# Patient Record
Sex: FEMALE | Race: WHITE | URBAN - METROPOLITAN AREA
[De-identification: names, ages, dates, MRNs, and addresses within clinical notes are randomized per-mention and may not be internally consistent; named-entity substitution may affect disease eponyms.]

---

## 2020-01-30 ENCOUNTER — OUTPATIENT (OUTPATIENT)
Dept: OUTPATIENT SERVICES | Facility: HOSPITAL | Age: 55
LOS: 1 days | Discharge: HOME | End: 2020-01-30
Payer: COMMERCIAL

## 2020-01-30 DIAGNOSIS — R07.9 CHEST PAIN, UNSPECIFIED: ICD-10-CM

## 2020-01-30 PROCEDURE — 75574 CT ANGIO HRT W/3D IMAGE: CPT | Mod: 26

## 2023-12-17 ENCOUNTER — EMERGENCY (EMERGENCY)
Facility: HOSPITAL | Age: 58
LOS: 0 days | Discharge: HOME | End: 2023-12-17
Attending: EMERGENCY MEDICINE
Payer: COMMERCIAL

## 2023-12-17 VITALS
SYSTOLIC BLOOD PRESSURE: 132 MMHG | OXYGEN SATURATION: 100 % | RESPIRATION RATE: 18 BRPM | DIASTOLIC BLOOD PRESSURE: 74 MMHG | TEMPERATURE: 98 F | WEIGHT: 179.9 LBS | HEART RATE: 80 BPM

## 2023-12-17 DIAGNOSIS — S62.623B DISPLACED FRACTURE OF MIDDLE PHALANX OF LEFT MIDDLE FINGER, INITIAL ENCOUNTER FOR OPEN FRACTURE: ICD-10-CM
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
## 2023-12-17 DIAGNOSIS — Z23 ENCOUNTER FOR IMMUNIZATION: ICD-10-CM

## 2023-12-17 DIAGNOSIS — M79.645 PAIN IN LEFT FINGER(S): ICD-10-CM

## 2023-12-17 PROCEDURE — 12001 RPR S/N/AX/GEN/TRNK 2.5CM/<: CPT | Mod: 59

## 2023-12-17 PROCEDURE — 73130 X-RAY EXAM OF HAND: CPT | Mod: 26,LT

## 2023-12-17 PROCEDURE — 90471 IMMUNIZATION ADMIN: CPT

## 2023-12-17 PROCEDURE — 73130 X-RAY EXAM OF HAND: CPT | Mod: LT

## 2023-12-17 PROCEDURE — 99284 EMERGENCY DEPT VISIT MOD MDM: CPT | Mod: 25,57

## 2023-12-17 PROCEDURE — 12001 RPR S/N/AX/GEN/TRNK 2.5CM/<: CPT

## 2023-12-17 PROCEDURE — 26720 TREAT FINGER FRACTURE EACH: CPT | Mod: 54

## 2023-12-17 PROCEDURE — 99283 EMERGENCY DEPT VISIT LOW MDM: CPT | Mod: 25

## 2023-12-17 PROCEDURE — 90715 TDAP VACCINE 7 YRS/> IM: CPT

## 2023-12-17 RX ORDER — TETANUS TOXOID, REDUCED DIPHTHERIA TOXOID AND ACELLULAR PERTUSSIS VACCINE, ADSORBED 5; 2.5; 8; 8; 2.5 [IU]/.5ML; [IU]/.5ML; UG/.5ML; UG/.5ML; UG/.5ML
0.5 SUSPENSION INTRAMUSCULAR ONCE
Refills: 0 | Status: COMPLETED | OUTPATIENT
Start: 2023-12-17 | End: 2023-12-17

## 2023-12-17 RX ADMIN — TETANUS TOXOID, REDUCED DIPHTHERIA TOXOID AND ACELLULAR PERTUSSIS VACCINE, ADSORBED 0.5 MILLILITER(S): 5; 2.5; 8; 8; 2.5 SUSPENSION INTRAMUSCULAR at 23:25

## 2023-12-17 NOTE — ED PROVIDER NOTE - CLINICAL SUMMARY MEDICAL DECISION MAKING FREE TEXT BOX
Healthy 58-year-old female here for assessment of left fifth digit pain after closing her finger in a car door.  Patient has small, 0.75 cm superficial laceration over palmar aspect of fifth digit, middle phalanx with significant tenderness to palpation in the same area.  Good cap refill, good pulses.    X-ray demonstrates fracture of middle phalanx.  Wound was cleaned and closed, ring on fourth digit was removed.  Given that this is technically an open fracture, antibiotics were initiated however is not an open fracture, does not need hand eval at this time or washout.  The finger was splinted as noted.    Advised patient on need for symptom monitoring, signs of infection, wound care, return precautions, need for medication compliance.  Will DC home.

## 2023-12-17 NOTE — ED PROCEDURE NOTE - CPROC ED TIME OUT STATEMENT1
“Patient's name, , procedure and correct site were confirmed during the [place] Timeout.” “Patient's name, , procedure and correct site were confirmed during the [place] Timeout.”

## 2023-12-17 NOTE — ED PROVIDER NOTE - OBJECTIVE STATEMENT
58 year old female presents to the ED with finger pain. Patients left 5th digit was caught between the car and door causing crush injury. She now has throbbing pain, swelling, redness, and a small laceration to the palmar side of the finger. She is able to move and bend the finger and has sensation. Denies active bleeding, wrist pain, other injury.

## 2023-12-17 NOTE — ED PROCEDURE NOTE - CPROC ED TIME OUT STATEMENT1
“Patient's name, , procedure and correct site were confirmed during the [place] Timeout.” “Patient's name, , procedure and correct site were confirmed during the [place] Timeout.”

## 2023-12-17 NOTE — ED PROCEDURE NOTE - NS ED ATTENDING STATEMENT MOD
This was a shared visit with the STEVE. I reviewed and verified the documentation and independently performed the documented:

## 2023-12-17 NOTE — ED PROVIDER NOTE - CARE PROVIDER_API CALL
Christopher Ayala [facility]  Plastic Surgery  [address] [place] [address]  Phone: [phone]  Fax: [phone]  Follow Up Time:    Christopher Ayala [facility]  Plastic Surgery  [address] [place] [address]  Phone: [phone]  Fax: [phone]  Follow Up Time:

## 2023-12-17 NOTE — ED PROVIDER NOTE - PHYSICAL EXAMINATION
Physical Exam    Constitutional: No acute distress.   Eyes: Conjunctiva pink, Sclera clear, PERRLA, EOMI.  ENT: No sinus tenderness. No nasal discharge. No oropharyngeal erythema, edema, or exudates. Uvula midline.   Cardiovascular: Regular rate, regular rhythm. No noted murmurs rubs or gallops.  Respiratory: unlabored respiratory effort, clear to auscultation bilaterally no wheezing, rales or rhonchi  Gastrointestinal: Normal bowel sounds. soft, non distended, non-tender abdomen.   Musculoskeletal: supple neck, no midline tenderness. Left 5th digit edematous with sub ungual hematoma. 2cm lac to DIP pinky.   Integumentary: warm, dry, no rash  Neurologic: awake, alert, cranial nerves II-XII grossly intact, extremities’ motor and sensory functions grossly intact  Psychiatric: appropriate mood, appropriate affect

## 2023-12-17 NOTE — ED PROVIDER NOTE - NSFOLLOWUPINSTRUCTIONS_ED_ALL_ED_FT
Please keep splint in place. Return to the ED in 7 days for removal of sutures. Please follow up with Dr. Ayala within 1 week for evaluation of laceration and fracture. Keep wound dry for 24 hours. Please take Augmentin twice per day for 7 days to prevent infection.     Our Emergency Department Referral Coordinators will be reaching out to you in the next 24-48 hours from 9:00am to 5:00pm with a follow up appointment. Please expect a phone call from the hospital in that time frame. If you do not receive a call or if you have any questions or concerns, you can reach them at [phone]     Finger Fracture    WHAT YOU NEED TO KNOW:    What is a finger fracture? A finger fracture is a break in 1 or more of the bones in your finger. It is most commonly caused by a direct blow to the finger.    What are the signs and symptoms of a finger fracture?     Pain, bruising, or swelling      Weakness or numbness      Trouble moving your finger      Finger looks abnormally shaped    How is a finger fracture diagnosed? Your healthcare provider will examine you and ask about your injury. You may also need an x-ray.     How is a finger fracture treated?     A cast or splint may be needed to prevent movement and protect your finger so it can heal.       NSAIDs, such as ibuprofen, help decrease swelling, pain, and fever. This medicine is available with or without a doctor's order. NSAIDs can cause stomach bleeding or kidney problems in certain people. If you take blood thinner medicine, always ask your healthcare provider if NSAIDs are safe for you. Always read the medicine label and follow directions.      Acetaminophen decreases pain and fever. It is available without a doctor's order. Ask how much to take and how often to take it. Follow directions. Acetaminophen can cause liver damage if not taken correctly.      Prescription pain medicine may be given. Ask your healthcare provider how to take this medicine safely. Some prescription pain medicines contain acetaminophen. Do not take other medicines that contain acetaminophen without talking to your healthcare provider. Too much acetaminophen may cause liver damage. Prescription pain medicine may cause constipation. Ask your healthcare provider how to prevent or treat constipation.       Closed reduction may be done to put your bones back into their correct position without surgery.       Open reduction surgery may be needed to put your bones back into the correct position. This may include the use of special wires, pins, plates or screws. These help keep the broken pieces lined up so your finger can heal correctly.    How can I manage my symptoms?     Apply ice on your finger for 15 to 20 minutes every hour or as directed. Use an ice pack, or put crushed ice in a plastic bag. Cover it with a towel before you apply it to your skin. Ice helps prevent tissue damage and decreases swelling and pain.      Elevate your finger above the level of your heart as often as you can. This will help decrease swelling and pain. Prop your hand on pillows or blankets to keep it elevated comfortably.       Go to physical therapy as directed. A physical therapist teaches you exercises to help improve movement and strength, and to decrease pain.     When should I seek immediate care?     Your cast or splint gets wet, damaged, or comes off.      Your splint or cast feels too tight.      You have severe pain.      Your injured finger is numb, cold, or pale.    When should I contact my healthcare provider?     Your pain or swelling gets worse, even after treatment.      You have questions or concerns about your condition or care.    CARE AGREEMENT:    You have the right to help plan your care. Learn about your health condition and how it may be treated. Discuss treatment options with your healthcare providers to decide what care you want to receive. You always have the right to refuse treatment.         Laceration    WHAT YOU NEED TO KNOW:    A laceration is an injury to the skin and the soft tissue underneath it. Lacerations happen when you are cut or hit by something. They can happen anywhere on the body.     DISCHARGE INSTRUCTIONS:    Return to the emergency department if:     You have heavy bleeding or bleeding that does not stop after 10 minutes of holding firm, direct pressure over the wound.       Your wound opens up.     Contact your healthcare provider if:     You have a fever or chills.       Your laceration is red, warm, or swollen.      You have red streaks on your skin coming from your wound.      You have white or yellow drainage from the wound that smells bad.      You have pain that gets worse, even after treatment.       You have questions or concerns about your condition or care.     Medicines:     Prescription pain medicine may be given. Ask how to take this medicine safely.       Antibiotics help treat or prevent a bacterial infection.       Take your medicine as directed. Contact your healthcare provider if you think your medicine is not helping or if you have side effects. Tell him or her if you are allergic to any medicine. Keep a list of the medicines, vitamins, and herbs you take. Include the amounts, and when and why you take them. Bring the list or the pill bottles to follow-up visits. Carry your medicine list with you in case of an emergency.    Care for your wound as directed:     Do not get your wound wet until your healthcare provider says it is okay. Do not soak your wound in water. Do not go swimming until your healthcare provider says it is okay. Carefully wash the wound with soap and water. Gently pat the area dry or allow it to air dry.       Change your bandages when they get wet, dirty, or after washing. Apply new, clean bandages as directed. Do not apply elastic bandages or tape too tight. Do not put powders or lotions over your incision.       Apply antibiotic ointment as directed. Your healthcare provider may give you antibiotic ointment to put over your wound if you have stitches. If you have strips of tape over your incision, let them dry up and fall off on their own. If they do not fall off within 14 days, gently remove them. If you have glue over your wound, do not remove or pick at it. If your glue comes off, do not replace it with glue that you have at home.       Check your wound every day for signs of infection such as swelling, redness, or pus.     Self-care:     Apply ice on your wound for 15 to 20 minutes every hour or as directed. Use an ice pack, or put crushed ice in a plastic bag. Cover it with a towel. Ice helps prevent tissue damage and decreases swelling and pain.      Use a splint as directed. A splint will decrease movement and stress on your wound. It may help it heal faster. A splint may be used for lacerations over joints or areas of your body that bend. Ask your healthcare provider how to apply and remove a splint.       Decrease scarring of your wound by applying ointments as directed. Do not apply ointments until your healthcare provider says it is okay. You may need to wait until your wound is healed. Ask which ointment to buy and how often to use it. After your wound is healed, use sunscreen over the area when you are out in the sun. You should do this for at least 6 months to 1 year after your injury.     Follow up with your healthcare provider as directed: You may need to follow up in 24 to 48 hours to have your wound checked for infection. You will need to return in 3 to 14 days if you have stitches or staples so they can be removed. Care for your wound as directed to prevent infection and help it heal. Write down your questions so you remember to ask them during your visits.       © Copyright [employer] 2019 All illustrations and images included in CareNotes are the copyrighted property of [employer] or [employer]. Please keep splint in place. Return to the ED in 7 days for removal of sutures. Please follow up with Dr. Ayala within 1 week for evaluation of laceration and fracture. Keep wound dry for 24 hours. Please take Augmentin twice per day for 7 days to prevent infection.     Our Emergency Department Referral Coordinators will be reaching out to you in the next 24-48 hours from 9:00am to 5:00pm with a follow up appointment. Please expect a phone call from the hospital in that time frame. If you do not receive a call or if you have any questions or concerns, you can reach them at [phone]     Finger Fracture    WHAT YOU NEED TO KNOW:    What is a finger fracture? A finger fracture is a break in 1 or more of the bones in your finger. It is most commonly caused by a direct blow to the finger.    What are the signs and symptoms of a finger fracture?     Pain, bruising, or swelling      Weakness or numbness      Trouble moving your finger      Finger looks abnormally shaped    How is a finger fracture diagnosed? Your healthcare provider will examine you and ask about your injury. You may also need an x-ray.     How is a finger fracture treated?     A cast or splint may be needed to prevent movement and protect your finger so it can heal.       NSAIDs, such as ibuprofen, help decrease swelling, pain, and fever. This medicine is available with or without a doctor's order. NSAIDs can cause stomach bleeding or kidney problems in certain people. If you take blood thinner medicine, always ask your healthcare provider if NSAIDs are safe for you. Always read the medicine label and follow directions.      Acetaminophen decreases pain and fever. It is available without a doctor's order. Ask how much to take and how often to take it. Follow directions. Acetaminophen can cause liver damage if not taken correctly.      Prescription pain medicine may be given. Ask your healthcare provider how to take this medicine safely. Some prescription pain medicines contain acetaminophen. Do not take other medicines that contain acetaminophen without talking to your healthcare provider. Too much acetaminophen may cause liver damage. Prescription pain medicine may cause constipation. Ask your healthcare provider how to prevent or treat constipation.       Closed reduction may be done to put your bones back into their correct position without surgery.       Open reduction surgery may be needed to put your bones back into the correct position. This may include the use of special wires, pins, plates or screws. These help keep the broken pieces lined up so your finger can heal correctly.    How can I manage my symptoms?     Apply ice on your finger for 15 to 20 minutes every hour or as directed. Use an ice pack, or put crushed ice in a plastic bag. Cover it with a towel before you apply it to your skin. Ice helps prevent tissue damage and decreases swelling and pain.      Elevate your finger above the level of your heart as often as you can. This will help decrease swelling and pain. Prop your hand on pillows or blankets to keep it elevated comfortably.       Go to physical therapy as directed. A physical therapist teaches you exercises to help improve movement and strength, and to decrease pain.     When should I seek immediate care?     Your cast or splint gets wet, damaged, or comes off.      Your splint or cast feels too tight.      You have severe pain.      Your injured finger is numb, cold, or pale.    When should I contact my healthcare provider?     Your pain or swelling gets worse, even after treatment.      You have questions or concerns about your condition or care.    CARE AGREEMENT:    You have the right to help plan your care. Learn about your health condition and how it may be treated. Discuss treatment options with your healthcare providers to decide what care you want to receive. You always have the right to refuse treatment.         Laceration    WHAT YOU NEED TO KNOW:    A laceration is an injury to the skin and the soft tissue underneath it. Lacerations happen when you are cut or hit by something. They can happen anywhere on the body.     DISCHARGE INSTRUCTIONS:    Return to the emergency department if:     You have heavy bleeding or bleeding that does not stop after 10 minutes of holding firm, direct pressure over the wound.       Your wound opens up.     Contact your healthcare provider if:     You have a fever or chills.       Your laceration is red, warm, or swollen.      You have red streaks on your skin coming from your wound.      You have white or yellow drainage from the wound that smells bad.      You have pain that gets worse, even after treatment.       You have questions or concerns about your condition or care.     Medicines:     Prescription pain medicine may be given. Ask how to take this medicine safely.       Antibiotics help treat or prevent a bacterial infection.       Take your medicine as directed. Contact your healthcare provider if you think your medicine is not helping or if you have side effects. Tell him or her if you are allergic to any medicine. Keep a list of the medicines, vitamins, and herbs you take. Include the amounts, and when and why you take them. Bring the list or the pill bottles to follow-up visits. Carry your medicine list with you in case of an emergency.    Care for your wound as directed:     Do not get your wound wet until your healthcare provider says it is okay. Do not soak your wound in water. Do not go swimming until your healthcare provider says it is okay. Carefully wash the wound with soap and water. Gently pat the area dry or allow it to air dry.       Change your bandages when they get wet, dirty, or after washing. Apply new, clean bandages as directed. Do not apply elastic bandages or tape too tight. Do not put powders or lotions over your incision.       Apply antibiotic ointment as directed. Your healthcare provider may give you antibiotic ointment to put over your wound if you have stitches. If you have strips of tape over your incision, let them dry up and fall off on their own. If they do not fall off within 14 days, gently remove them. If you have glue over your wound, do not remove or pick at it. If your glue comes off, do not replace it with glue that you have at home.       Check your wound every day for signs of infection such as swelling, redness, or pus.     Self-care:     Apply ice on your wound for 15 to 20 minutes every hour or as directed. Use an ice pack, or put crushed ice in a plastic bag. Cover it with a towel. Ice helps prevent tissue damage and decreases swelling and pain.      Use a splint as directed. A splint will decrease movement and stress on your wound. It may help it heal faster. A splint may be used for lacerations over joints or areas of your body that bend. Ask your healthcare provider how to apply and remove a splint.       Decrease scarring of your wound by applying ointments as directed. Do not apply ointments until your healthcare provider says it is okay. You may need to wait until your wound is healed. Ask which ointment to buy and how often to use it. After your wound is healed, use sunscreen over the area when you are out in the sun. You should do this for at least 6 months to 1 year after your injury.     Follow up with your healthcare provider as directed: You may need to follow up in 24 to 48 hours to have your wound checked for infection. You will need to return in 3 to 14 days if you have stitches or staples so they can be removed. Care for your wound as directed to prevent infection and help it heal. Write down your questions so you remember to ask them during your visits.       © Copyright [employer] 2019 All illustrations and images included in CareNotes are the copyrighted property of [employer] or [employer].

## 2023-12-17 NOTE — ED ADULT NURSE NOTE - NSFALLUNIVINTERV_ED_ALL_ED
Bed/Stretcher in lowest position, wheels locked, appropriate side rails in place/Call bell, personal items and telephone in reach/Instruct patient to call for assistance before getting out of bed/chair/stretcher/Non-slip footwear applied when patient is off stretcher/[place] to call system/Physically safe environment - no spills, clutter or unnecessary equipment/Purposeful proactive rounding/Room/bathroom lighting operational, light cord in reach Bed/Stretcher in lowest position, wheels locked, appropriate side rails in place/Call bell, personal items and telephone in reach/Instruct patient to call for assistance before getting out of bed/chair/stretcher/Non-slip footwear applied when patient is off stretcher/[place] to call system/Physically safe environment - no spills, clutter or unnecessary equipment/Purposeful proactive rounding/Room/bathroom lighting operational, light cord in reach

## 2023-12-17 NOTE — ED PROVIDER NOTE - PATIENT PORTAL LINK FT
You can access the FollowMyHealth Patient Portal offered by [facility] by registering at the following website: [email or web address]. By joining [employer]’s FollowMyHealth portal, you will also be able to view your health information using other applications (apps) compatible with our system. You can access the FollowMyHealth Patient Portal offered by [facility] by registering at the following website: [email or web address]. By joining [employer] [facility]’s FollowMyHealth portal, you will also be able to view your health information using other applications (apps) compatible with our system.

## 2023-12-17 NOTE — ED PROCEDURE NOTE - CPROC ED TIME OUT STATEMENT1
“Patient's name, , procedure and correct site were confirmed during the [place] Timeout.” “Patient's name, , procedure and correct site were confirmed during the [place] Timeout.”

## 2023-12-17 NOTE — ED ADULT TRIAGE NOTE - WEIGHT IN LBS
Junior calling back from [place] -- message relayed from dr morales, she said his symptoms are already pretty bad and an barely make it to the couch from his bathroom without being completely out of breath, she was told about the walk in clinic she said he can barely make it to his front porch due to not being able to breath     She stated she will tell him about the walk in, but will keep his already scheduled appointment 179.8

## 2023-12-19 NOTE — CHART NOTE - NSCHARTNOTEFT_GEN_A_CORE
Hand follow up scheduled for 12/21 at 1045 with Dr. Ayala [address]. Pt aware of appt details. Hand follow up scheduled for 12/21 at 1045 with Dr. Ayala [address]. Pt aware of appt details.

## 2024-01-03 ENCOUNTER — EMERGENCY (EMERGENCY)
Facility: HOSPITAL | Age: 59
LOS: 0 days | Discharge: HOME | End: 2024-01-03
Attending: STUDENT IN AN ORGANIZED HEALTH CARE EDUCATION/TRAINING PROGRAM
Payer: COMMERCIAL

## 2024-01-03 VITALS
OXYGEN SATURATION: 98 % | HEIGHT: 61 IN | TEMPERATURE: 98 F | SYSTOLIC BLOOD PRESSURE: 145 MMHG | HEART RATE: 75 BPM | DIASTOLIC BLOOD PRESSURE: 75 MMHG | WEIGHT: 179.9 LBS

## 2024-01-03 DIAGNOSIS — S61.217D LACERATION WITHOUT FOREIGN BODY OF LEFT LITTLE FINGER WITHOUT DAMAGE TO NAIL, SUBSEQUENT ENCOUNTER: ICD-10-CM

## 2024-01-03 PROCEDURE — L9995: CPT

## 2024-01-03 PROCEDURE — 99212 OFFICE O/P EST SF 10 MIN: CPT

## 2024-01-03 NOTE — ED PROCEDURE NOTE - CPROC ED TIME OUT STATEMENT1
“Patient's name, , procedure and correct site were confirmed during the [place] Timeout.” “Patient's name, , procedure and correct site were confirmed during the [place] Timeout.”

## 2024-01-03 NOTE — ED PROVIDER NOTE - CLINICAL SUMMARY MEDICAL DECISION MAKING FREE TEXT BOX
58-year-old female, no apparent past medical history, presenting for suture removal of left fifth digit, sutures placed 12/17.  Patient followed up with plastics who recommended removal 1/4.  However patient has a flight of the country later today and would like her sutures removed before she leaves.  Denies fevers, chills, numbness, tingling, weakness, discharge or bleeding.  Left fifth digit with full range of motion and extension and flexion at DIP and PIP.  Sensation intact.  Good cap refill.  4 sutures identified and removed.  Wound well-healed without dehiscence or discharge.  Given return precautions and follow-up outpatient.  Patient and daughter comfortable with plan. 58-year-old female, no pertinent past medical history, presenting for suture removal of left fifth digit, sutures placed 12/17.  Patient followed up with plastics who recommended removal 1/4.  However patient has a flight of the country later today and would like her sutures removed before she leaves.  Denies fevers, chills, numbness, tingling, weakness, discharge or bleeding.  Left fifth digit with full range of motion and extension and flexion at DIP and PIP.  Sensation intact.  Good cap refill.  4 sutures identified and removed.  Wound well-healed without dehiscence or discharge.  Given return precautions and follow-up outpatient.  Patient and daughter comfortable with plan.

## 2024-01-03 NOTE — ED PROVIDER NOTE - PATIENT PORTAL LINK FT
You can access the FollowMyHealth Patient Portal offered by [facility] by registering at the following website: [email or web address]. By joining [employer]’s FollowMyHealth portal, you will also be able to view your health information using other applications (apps) compatible with our system. You can access the FollowMyHealth Patient Portal offered by [facility] by registering at the following website: [email or web address]. By joining [employer]’s FollowMyHealth portal, you will also be able to view your health information using other applications (apps) compatible with our system.

## 2024-01-03 NOTE — ED ADULT NURSE NOTE - CHIEF COMPLAINT QUOTE
She wants to get her stiches out, she's going to [place] today, her mother passed away - daughter She wants to get her stiches out, she's going to [place] today, her mother passed away - daughter

## 2024-01-03 NOTE — ED PROVIDER NOTE - OBJECTIVE STATEMENT
58-year-old female, no pertinent past medical history, presenting for suture removal of left fifth digit, sutures placed 12/17.  Patient followed up with plastics who recommended removal 1/4.  However patient has a flight of the country later today and would like her sutures removed before she leaves.  Denies fevers, chills, numbness, tingling, weakness, discharge or bleeding.

## 2024-01-03 NOTE — ED PROVIDER NOTE - PHYSICAL EXAMINATION
CONSTITUTIONAL: Well-developed; well-nourished; in no acute distress.   SKIN: warm, dry  HEAD: Normocephalic  EYES: PERRL, EOMI, no conjunctival erythema  ENT: No nasal discharge; airway clear.  NECK: Supple; non tender.  EXT: Normal ROM.  No clubbing, cyanosis or edema.  +Left fifth digit with full range of motion and extension and flexion at DIP and PIP.  Sensation intact.  Good cap refill.  4 sutures identified and removed.  Wound well-healed without dehiscence or discharge.    NEURO: Alert, oriented, grossly unremarkable  PSYCH: Cooperative, appropriate.

## 2024-01-03 NOTE — ED ADULT TRIAGE NOTE - PAIN: PRESENCE, MLM
PRE-OP DIAGNOSIS:  CHB (complete heart block) 20-Dec-2019 12:55:45  Dawit Zavala
denies pain/discomfort (Rating = 0)

## 2024-01-03 NOTE — ED ADULT TRIAGE NOTE - CHIEF COMPLAINT QUOTE
She wants to get her stiches out, she's going to [place] today, her mother passed away - daughter She wants to get her stiches out, she's going to [place] today, her mother passed away - daughter

## 2024-02-22 ENCOUNTER — APPOINTMENT (OUTPATIENT)
Dept: NEUROLOGY | Facility: CLINIC | Age: 59
End: 2024-02-22
Payer: COMMERCIAL

## 2024-02-22 VITALS
BODY MASS INDEX: 33.99 KG/M2 | SYSTOLIC BLOOD PRESSURE: 134 MMHG | WEIGHT: 180 LBS | HEIGHT: 61 IN | DIASTOLIC BLOOD PRESSURE: 94 MMHG | HEART RATE: 82 BPM

## 2024-02-22 PROBLEM — Z00.00 ENCOUNTER FOR PREVENTIVE HEALTH EXAMINATION: Status: ACTIVE | Noted: 2024-02-22

## 2024-02-22 PROCEDURE — G2211 COMPLEX E/M VISIT ADD ON: CPT | Mod: NC,1L

## 2024-02-22 PROCEDURE — 99204 OFFICE O/P NEW MOD 45 MIN: CPT

## 2024-02-23 ENCOUNTER — APPOINTMENT (OUTPATIENT)
Dept: MRI IMAGING | Facility: CLINIC | Age: 59
End: 2024-02-23
Payer: COMMERCIAL

## 2024-02-23 PROCEDURE — 70551 MRI BRAIN STEM W/O DYE: CPT

## 2024-02-27 NOTE — PHYSICAL EXAM
[Person] : oriented to person [Time] : oriented to time [Place] : oriented to place [Concentration Intact] : normal concentrating ability [Visual Intact] : visual attention was ~T not ~L decreased [Naming Objects] : no difficulty naming common objects [Repeating Phrases] : no difficulty repeating a phrase [Writing A Sentence] : no difficulty writing a sentence [Fluency] : fluency intact [Reading] : reading intact [Comprehension] : comprehension intact [Past History] : adequate knowledge of personal past history [Cranial Nerves Optic (II)] : visual acuity intact bilaterally,  visual fields full to confrontation, pupils equal round and reactive to light [Cranial Nerves Oculomotor (III)] : extraocular motion intact [Cranial Nerves Trigeminal (V)] : facial sensation intact symmetrically [Cranial Nerves Facial (VII)] : face symmetrical [Cranial Nerves Vestibulocochlear (VIII)] : hearing was intact bilaterally [Cranial Nerves Glossopharyngeal (IX)] : tongue and palate midline [Cranial Nerves Hypoglossal (XII)] : there was no tongue deviation with protrusion [Cranial Nerves Accessory (XI - Cranial And Spinal)] : head turning and shoulder shrug symmetric [Motor Tone] : muscle tone was normal in all four extremities [Motor Strength] : muscle strength was normal in all four extremities [No Muscle Atrophy] : normal bulk in all four extremities [Sensation Tactile Decrease] : light touch was intact [Abnormal Walk] : normal gait [Balance] : balance was intact [2+] : Ankle jerk left 2+ [Past-pointing] : there was no past-pointing [Tremor] : no tremor present [Plantar Reflex Right Only] : normal on the right [Plantar Reflex Left Only] : normal on the left [FreeTextEntry5] : Decreased visual field on the right

## 2024-02-27 NOTE — ASSESSMENT
[FreeTextEntry1] : Headache/visual disturbance - MRI of the Brain without Russell.  - EEG.  - magnesium for prophylaxis -Follow-up with ophthalmology for visual field testing which is more objective  Vertigo -ENG/PAT test - Vestibular therapy - I will Follow up with her in One Month.  Total clinician time spent  is  46 minutes including preparing to see the patient, obtaining and/or reviewing and confirming history, performing a medically necessary and appropriate examination, counseling and educating the patient and/or family, documenting clinical information in the HER and communicating and/or referring to other healthcare professionals.   I, Twyla Sanabria, Attest that this documentation has been prepared under the direction and in the presence of Provider Kirit Thomas DO  Thank You for letting me assist in the management of this patient.   Kirit Thomas DO Board Certified, Neurology

## 2024-02-27 NOTE — HISTORY OF PRESENT ILLNESS
[FreeTextEntry1] : It is a pleasure to see MS. JAIN In the office today. She is A 58-Year-old Woman who presents to the office today for the evaluation of Headaches that gradually progressed for years associated with vertigo symptoms including dizziness. The symptoms are separate from headaches. There is no history of vertigo symptoms in the past. However, she noticed this progress withing the last 6 months. When looking back, there is no change in diet, sleep, medication.  She denies any visual disturbance or incidences of running into things, however on exam she has decreased peripheral vision on the right side

## 2024-02-29 ENCOUNTER — APPOINTMENT (OUTPATIENT)
Dept: NEUROLOGY | Facility: CLINIC | Age: 59
End: 2024-02-29
Payer: COMMERCIAL

## 2024-02-29 PROCEDURE — 92547 SUPPLEMENTAL ELECTRICAL TEST: CPT

## 2024-02-29 PROCEDURE — 92546 SINUSOIDAL ROTATIONAL TEST: CPT

## 2024-02-29 PROCEDURE — 92542 POSITIONAL NYSTAGMUS TEST: CPT

## 2024-04-08 ENCOUNTER — APPOINTMENT (OUTPATIENT)
Dept: NEUROLOGY | Facility: CLINIC | Age: 59
End: 2024-04-08
Payer: COMMERCIAL

## 2024-04-08 PROCEDURE — 95819 EEG AWAKE AND ASLEEP: CPT

## 2024-04-17 ENCOUNTER — APPOINTMENT (OUTPATIENT)
Dept: NEUROLOGY | Facility: CLINIC | Age: 59
End: 2024-04-17
Payer: COMMERCIAL

## 2024-04-17 VITALS — BODY MASS INDEX: 34.95 KG/M2 | WEIGHT: 178 LBS | HEIGHT: 60 IN

## 2024-04-17 VITALS — SYSTOLIC BLOOD PRESSURE: 138 MMHG | HEART RATE: 73 BPM | DIASTOLIC BLOOD PRESSURE: 74 MMHG

## 2024-04-17 PROCEDURE — 99214 OFFICE O/P EST MOD 30 MIN: CPT

## 2024-04-17 PROCEDURE — G2211 COMPLEX E/M VISIT ADD ON: CPT | Mod: NC,1L

## 2024-04-17 RX ORDER — KRILL/OM-3/DHA/EPA/PHOSPHO/AST 1000-230MG
81 CAPSULE ORAL DAILY
Qty: 90 | Refills: 3 | Status: ACTIVE | COMMUNITY
Start: 2024-04-17 | End: 1900-01-01

## 2024-04-17 NOTE — ASSESSMENT
[FreeTextEntry1] : Vertigo - ENG/VAT test reviewed and discussed with patient as well as  on the phone - Trial of vestibular therapy  History of CVA - MRA of the brain - Blood work for hypercoagulability - Start daily aspirin  Total clinician time spent  is  35 minutes including preparing to see the patient, obtaining and/or reviewing and confirming history, performing a medically necessary and appropriate examination, counseling and educating the patient and/or family, documenting clinical information in the HER and communicating and/or referring to other healthcare professionals.   Kirit Thomas DO Board Certified, Neurology

## 2024-04-17 NOTE — HISTORY OF PRESENT ILLNESS
[FreeTextEntry1] : Ms. PRUDENCE JAIN returns to the office for follow-up and her prior history and physical have been reviewed and she reports no change since last visit.  She was last seen for vertigo .  She was sent for EMG/VAT test which show evidence of peripheral vestibular dysfunction.  She was recommended to go for vestibular therapy which she has not done.  On exam she was noted to have decreased vision on the right side and was sent for MRI of the brain which showed evidence of chronic stroke in the left occipital lobe.  Even though she denied having stroke last time, today she reports that her eye doctor told her that she had a stroke over 30 years ago.  She supposedly had a normal carotid ultrasound and has seen a cardiologist with a normal cardiac workup but never had MRA of the brain, nor hypercoagulable workup.  Patient is currently taking a cholesterol medication but is not on an antiplatelet.    It is a pleasure to see MS. JAIN In the office today. She is A 58-Year-old Woman who presents to the office today for the evaluation of Headaches that gradually progressed for years associated with vertigo symptoms including dizziness. The symptoms are separate from headaches. There is no history of vertigo symptoms in the past. However, she noticed this progress withing the last 6 months. When looking back, there is no change in diet, sleep, medication.  She denies any visual disturbance or incidences of running into things, however on exam she has decreased peripheral vision on the right side

## 2024-04-17 NOTE — PHYSICAL EXAM

## 2024-04-28 ENCOUNTER — APPOINTMENT (OUTPATIENT)
Dept: MRI IMAGING | Facility: CLINIC | Age: 59
End: 2024-04-28
Payer: COMMERCIAL

## 2024-04-28 PROCEDURE — 70544 MR ANGIOGRAPHY HEAD W/O DYE: CPT

## 2024-06-18 ENCOUNTER — APPOINTMENT (OUTPATIENT)
Dept: NEUROLOGY | Facility: CLINIC | Age: 59
End: 2024-06-18
Payer: COMMERCIAL

## 2024-06-18 VITALS
SYSTOLIC BLOOD PRESSURE: 134 MMHG | HEIGHT: 60 IN | HEART RATE: 65 BPM | BODY MASS INDEX: 34.36 KG/M2 | DIASTOLIC BLOOD PRESSURE: 75 MMHG | WEIGHT: 175 LBS

## 2024-06-18 DIAGNOSIS — R42 DIZZINESS AND GIDDINESS: ICD-10-CM

## 2024-06-18 DIAGNOSIS — H53.9 UNSPECIFIED VISUAL DISTURBANCE: ICD-10-CM

## 2024-06-18 DIAGNOSIS — G43.709 CHRONIC MIGRAINE W/OUT AURA, NOT INTRACTABLE, W/OUT STATUS MIGRAINOSUS: ICD-10-CM

## 2024-06-18 DIAGNOSIS — R73.09 OTHER ABNORMAL GLUCOSE: ICD-10-CM

## 2024-06-18 DIAGNOSIS — R90.89 OTHER ABNORMAL FINDINGS ON DIAGNOSTIC IMAGING OF CENTRAL NERVOUS SYSTEM: ICD-10-CM

## 2024-06-18 DIAGNOSIS — Z86.73 PERSONAL HISTORY OF TRANSIENT ISCHEMIC ATTACK (TIA), AND CEREBRAL INFARCTION W/OUT RESIDUAL DEFICITS: ICD-10-CM

## 2024-06-18 PROCEDURE — 99215 OFFICE O/P EST HI 40 MIN: CPT

## 2024-06-18 NOTE — REVIEW OF SYSTEMS
[As Noted in HPI] : as noted in HPI [Dizziness] : dizziness [Migraine Headache] : migraine headaches [Tension Headache] : tension-type headaches

## 2024-06-18 NOTE — ASSESSMENT
[FreeTextEntry1] : 58 year old female with HTN, HLD, suspected DM2 under our care for frequent headaches and episodes of dizziness. MRI Brain revealed new finding of a chronic left occipital infarct. MRA Brain was suspicious for stenosis vs occlusion of the left PCA. MRA of the neck was ordered today as advised by radiology and she was provided referral to be evaluated by Neuroendovascular Surgery and a Cardiologist. She will continue ASA 81mg and Statin therapy in the mean time and will f/u with her PCP. VAt/ ENG testing was consistent with peripheral vestibular pathology and patient will begin vestibular therapy although her symptoms are likely related to her cerebrovascular disease. She will RTO after seeing the above providers and  MRA Neck is complete for review. Patient was advised to go to the ED / call 911 for emergent evaluation if she is to develop any stroke like symptoms and expressed verbal understanding.   Supervising Physician : Ascencion Gonzales MD

## 2024-06-18 NOTE — HISTORY OF PRESENT ILLNESS
[FreeTextEntry1] : Original presentation : It is a pleasure to see MS. JAIN In the office today. She is A 58-Year-old Woman who presents to the office today for the evaluation of Headaches that gradually progressed for years associated with vertigo symptoms including dizziness. The symptoms are separate from headaches. There is no history of vertigo symptoms in the past. However, she noticed this progress withing the last 6 months. When looking back, there is no change in diet, sleep, medication. She denies any visual disturbance or incidences of running into things, however on exam she has decreased peripheral vision on the right side   Diagnostic Testing :   MRI Brain 2.23.24 : Small chronic infarct  affects the left occipital lobe medially with associated dilation of the occipital horn of the left lateral ventricle.    MRA Brain 4.28.24 : Decreased diameter of the left PCA proximally and severe attenuation distally consistent with stenosis and or occlusion in the region of the demonstrated chronic occipital and medial temporal lobe infarction. Correlation with MRA of the arteries of the neck may be helpful to evaluate for carotid stenosis, plaque formation and possible ulceration.    Routine EEg 4.8.24  : Normal   VAt/ ENG 2.29.24 : Consistent with peripheral vestibular pathology. These patients typically respond well to vestibular therapy.   Lab work :   Cholesterol elevated  A1c 7.3  Triglycerides Elevated   Today : Today I had the pleasure of seeing Ms. JAIN in our office for follow up.  Her past medical history and imaging have been reviewed.     Patient first presented to our office for evaluation of headaches and dizziness. MRI of the Brain revealed a small chronic infarct affecting the left occipital and medial temporal lobe. Patient was unaware of this finding and denies residual deficits or unilateral motor weakness. Today we reviewed her diagnostic testing noted above. MRA revealed decreased diameter of the left PCA proximally and severe attenuation distally consistent with stenosis and or occlusion in the region of the demonstrated chronic occipital and medial temporal lobe infarction. MRA of the neck was recommended for further evaluation and will be ordered today. EEg was normal.  Today we also reviewed her Lab work which revealed elevated cholesterol and A1c of 7.3.  Vat/ ENG testing was consistent with peripheral vestibular pathology and she was provided a script to begin vestibular therapy at her prior visit but has not started.  Given evidence of chronic infarcts in the setting of uncontrolled HLD and diabetes I will provide her with a referral to see a cardiologist for evaluation. She is currently on [id number] daily as well as statin therapy prescribed by her PCP. Today she reports less episodes of dizziness and headache than last month and denies any new or progressive episodes.  On exam she maintains 5/5 power to all extremities with 2+ reflexes throughout, facial features symmetric speech clear and she ambulates independently without difficulty.

## 2024-07-17 ENCOUNTER — RESULT REVIEW (OUTPATIENT)
Age: 59
End: 2024-07-17

## 2024-07-17 ENCOUNTER — OUTPATIENT (OUTPATIENT)
Dept: OUTPATIENT SERVICES | Facility: HOSPITAL | Age: 59
LOS: 1 days | End: 2024-07-17
Payer: COMMERCIAL

## 2024-07-17 DIAGNOSIS — R42 DIZZINESS AND GIDDINESS: ICD-10-CM

## 2024-07-17 DIAGNOSIS — Z00.8 ENCOUNTER FOR OTHER GENERAL EXAMINATION: ICD-10-CM

## 2024-07-17 PROCEDURE — A9579: CPT

## 2024-07-17 PROCEDURE — 70548 MR ANGIOGRAPHY NECK W/DYE: CPT | Mod: 26

## 2024-07-17 PROCEDURE — 70548 MR ANGIOGRAPHY NECK W/DYE: CPT

## 2024-07-18 DIAGNOSIS — R42 DIZZINESS AND GIDDINESS: ICD-10-CM

## 2024-09-17 ENCOUNTER — APPOINTMENT (OUTPATIENT)
Dept: NEUROLOGY | Facility: CLINIC | Age: 59
End: 2024-09-17
Payer: COMMERCIAL

## 2024-09-17 VITALS — HEIGHT: 60 IN | BODY MASS INDEX: 35.14 KG/M2 | WEIGHT: 179 LBS

## 2024-09-17 VITALS — DIASTOLIC BLOOD PRESSURE: 67 MMHG | SYSTOLIC BLOOD PRESSURE: 122 MMHG | HEART RATE: 76 BPM

## 2024-09-17 DIAGNOSIS — R42 DIZZINESS AND GIDDINESS: ICD-10-CM

## 2024-09-17 DIAGNOSIS — Z86.73 PERSONAL HISTORY OF TRANSIENT ISCHEMIC ATTACK (TIA), AND CEREBRAL INFARCTION W/OUT RESIDUAL DEFICITS: ICD-10-CM

## 2024-09-17 PROCEDURE — 99214 OFFICE O/P EST MOD 30 MIN: CPT

## 2024-09-17 NOTE — HISTORY OF PRESENT ILLNESS
[FreeTextEntry1] : Ms. PRUDENCE JAIN returns to the office for follow-up and her prior history and physical have been reviewed and she reports no change since last visit.  Patient's daughter is on the phone today and she provided most of the history.  Patient, initially came in for the evaluation of headache and vertigo.  After she was sent for MRI of the brain, she was found to have chronic infarct in the left occipital lobe as well as stenosis in the left posterior cerebral artery-MRA of the neck was normal.  Blood work show mild elevation in protein C activity.  She was recommended to follow-up with a neurovascular neurology/neurosurgery which she has not done.  She did follow-up with a cardiologist, and was told that her heart is fine.  In terms of her vertigo, even though she never did the vestibular therapy, she claims that is not bothering her at all at this time.

## 2024-09-17 NOTE — ASSESSMENT
[FreeTextEntry1] : History of CVA - Stroke workup completed - Another referral given to patient for neurovascular/neurology, and explained to daughter on the phone that this is necessary for secondary stroke prevention and she reported understanding - Blood work result also given to patient for her to bring back to her medical doctor regarding elevated protein C activity, and decide if she needs to be referred to hematology -Continue with aspirin and atorvastatin for secondary stroke prevention for now -Follow-up with neurology after seeing the specialists  Vertigo-secondary to peripheral vestibular dysfunction - Currently asymptomatic, can hold off on doing vestibular therapy  Total clinician time spent  is  35 minutes including preparing to see the patient, obtaining and/or reviewing and confirming history, performing a medically necessary and appropriate examination, counseling and educating the patient and/or family, documenting clinical information in the HER and communicating and/or referring to other healthcare professionals.

## 2024-10-02 ENCOUNTER — NON-APPOINTMENT (OUTPATIENT)
Age: 59
End: 2024-10-02

## 2024-10-02 ENCOUNTER — APPOINTMENT (OUTPATIENT)
Dept: NEUROLOGY | Facility: CLINIC | Age: 59
End: 2024-10-02
Payer: COMMERCIAL

## 2024-10-02 DIAGNOSIS — Z86.73 PERSONAL HISTORY OF TRANSIENT ISCHEMIC ATTACK (TIA), AND CEREBRAL INFARCTION W/OUT RESIDUAL DEFICITS: ICD-10-CM

## 2024-10-02 PROCEDURE — 99204 OFFICE O/P NEW MOD 45 MIN: CPT

## 2024-10-02 RX ORDER — FAMOTIDINE 40 MG/1
40 TABLET, FILM COATED ORAL
Refills: 0 | Status: ACTIVE | COMMUNITY

## 2024-10-02 RX ORDER — ROSUVASTATIN CALCIUM 10 MG/1
10 TABLET, FILM COATED ORAL
Refills: 0 | Status: ACTIVE | COMMUNITY

## 2024-10-02 RX ORDER — ERGOCALCIFEROL (VITAMIN D2) 1250 MCG
50000 CAPSULE ORAL
Refills: 0 | Status: ACTIVE | COMMUNITY

## 2024-10-02 NOTE — REASON FOR VISIT
[Consultation] : a consultation visit [Patient Declined  Services] : - None: Patient declined  services

## 2024-10-05 NOTE — HISTORY OF PRESENT ILLNESS
[FreeTextEntry1] : Ms. Varghese is a 58-year-old woman with PMH of left occipital stroke, migraines, and vertigo who was referred to me by Dr. Thomas for evaluation of decreased diameter of the L PCA concerning for stenosis vs occlusion on MRA brain. Patient reports she had a stroke some time ago that affected the vision on her right side and was told by her eye doctor. She was started on ASA by Dr. Thomas and was referred to a cardiologist. She has an appointment with cardiology next week. Her PCP started her on statin therapy. She reports compliance with medications, tolerating well w/o issue. Denies any new weakness, numbness, speech difficulty, or vision changes

## 2024-10-05 NOTE — ASSESSMENT
[FreeTextEntry1] : Ms. Varghese is a 58-year-old woman with PMH of left occipital stroke, migraines, and vertigo who was referred to me by Dr. Thomas for evaluation of decreased diameter of the L PCA concerning for stenosis vs occlusion on MRA brain.  L PCA lesion could be chronic thromboembolism in setting of left occipital stroke instead of in-situ atheroscleortic disease.   Patient feels well. No new neurological symptoms. Cont. ASA & Statin Will obtain outside imaging  Advised to f/u with cardiology to complete workup; TTE, possible ILR

## 2024-10-05 NOTE — PHYSICAL EXAM
[FreeTextEntry1] : Awake and alert Fully conversant and atentive Pupils are equal, round, and reactive to light Right homonymous hemianopia Extra ocular movements are intact; facial sensation intact; facial motor intact b/l; tongue and uvula midline Full strength in the arms and legs Full sensation in the arms and legs Finger to nose intact b/l; no dysmetria Gait is normal.

## 2024-10-11 ENCOUNTER — NON-APPOINTMENT (OUTPATIENT)
Age: 59
End: 2024-10-11

## 2024-10-11 ENCOUNTER — APPOINTMENT (OUTPATIENT)
Dept: NEUROSURGERY | Facility: CLINIC | Age: 59
End: 2024-10-11
Payer: COMMERCIAL

## 2024-10-11 VITALS
BODY MASS INDEX: 35.34 KG/M2 | DIASTOLIC BLOOD PRESSURE: 82 MMHG | WEIGHT: 180 LBS | HEIGHT: 60 IN | SYSTOLIC BLOOD PRESSURE: 131 MMHG

## 2024-10-11 DIAGNOSIS — Z78.9 OTHER SPECIFIED HEALTH STATUS: ICD-10-CM

## 2024-10-11 DIAGNOSIS — Z86.39 PERSONAL HISTORY OF OTHER ENDOCRINE, NUTRITIONAL AND METABOLIC DISEASE: ICD-10-CM

## 2024-10-11 DIAGNOSIS — R90.89 OTHER ABNORMAL FINDINGS ON DIAGNOSTIC IMAGING OF CENTRAL NERVOUS SYSTEM: ICD-10-CM

## 2024-10-11 DIAGNOSIS — R73.03 PREDIABETES.: ICD-10-CM

## 2024-10-11 PROCEDURE — 99203 OFFICE O/P NEW LOW 30 MIN: CPT

## 2024-10-17 ENCOUNTER — NON-APPOINTMENT (OUTPATIENT)
Age: 59
End: 2024-10-17

## 2024-11-11 ENCOUNTER — LABORATORY RESULT (OUTPATIENT)
Age: 59
End: 2024-11-11

## 2024-11-11 ENCOUNTER — OUTPATIENT (OUTPATIENT)
Dept: OUTPATIENT SERVICES | Facility: HOSPITAL | Age: 59
LOS: 1 days | End: 2024-11-11
Payer: COMMERCIAL

## 2024-11-11 ENCOUNTER — APPOINTMENT (OUTPATIENT)
Age: 59
End: 2024-11-11
Payer: COMMERCIAL

## 2024-11-11 VITALS
HEIGHT: 60 IN | BODY MASS INDEX: 35.53 KG/M2 | RESPIRATION RATE: 16 BRPM | WEIGHT: 181 LBS | OXYGEN SATURATION: 99 % | DIASTOLIC BLOOD PRESSURE: 74 MMHG | HEART RATE: 82 BPM | SYSTOLIC BLOOD PRESSURE: 128 MMHG

## 2024-11-11 DIAGNOSIS — R79.82 ELEVATED C-REACTIVE PROTEIN (CRP): ICD-10-CM

## 2024-11-11 DIAGNOSIS — Z86.73 PERSONAL HISTORY OF TRANSIENT ISCHEMIC ATTACK (TIA), AND CEREBRAL INFARCTION W/OUT RESIDUAL DEFICITS: ICD-10-CM

## 2024-11-11 PROCEDURE — 86146 BETA-2 GLYCOPROTEIN ANTIBODY: CPT

## 2024-11-11 PROCEDURE — 99204 OFFICE O/P NEW MOD 45 MIN: CPT

## 2024-11-11 PROCEDURE — 85730 THROMBOPLASTIN TIME PARTIAL: CPT

## 2024-11-11 PROCEDURE — 85613 RUSSELL VIPER VENOM DILUTED: CPT

## 2024-11-12 DIAGNOSIS — R79.82 ELEVATED C-REACTIVE PROTEIN (CRP): ICD-10-CM

## 2024-11-13 ENCOUNTER — APPOINTMENT (OUTPATIENT)
Dept: NEUROLOGY | Facility: CLINIC | Age: 59
End: 2024-11-13

## 2024-11-19 LAB
B2 GLYCOPROT1 IGG SER-ACNC: <1.4 U/ML
B2 GLYCOPROT1 IGM SER-ACNC: <1.5 U/ML
CONFIRM: NORMAL
DRVVT IMM 1:2 NP PPP: NORMAL
DRVVT SCREEN TO CONFIRM RATIO: 1.08 RATIO
SCREEN DRVVT: NORMAL

## 2024-11-22 ENCOUNTER — RESULT REVIEW (OUTPATIENT)
Age: 59
End: 2024-11-22

## 2024-11-22 ENCOUNTER — OUTPATIENT (OUTPATIENT)
Dept: OUTPATIENT SERVICES | Facility: HOSPITAL | Age: 59
LOS: 1 days | End: 2024-11-22
Payer: COMMERCIAL

## 2024-11-22 DIAGNOSIS — Z00.8 ENCOUNTER FOR OTHER GENERAL EXAMINATION: ICD-10-CM

## 2024-11-22 DIAGNOSIS — R90.89 OTHER ABNORMAL FINDINGS ON DIAGNOSTIC IMAGING OF CENTRAL NERVOUS SYSTEM: ICD-10-CM

## 2024-11-22 PROCEDURE — 70496 CT ANGIOGRAPHY HEAD: CPT

## 2024-11-22 PROCEDURE — 70496 CT ANGIOGRAPHY HEAD: CPT | Mod: 26

## 2024-11-22 PROCEDURE — 70498 CT ANGIOGRAPHY NECK: CPT

## 2024-11-22 PROCEDURE — 70498 CT ANGIOGRAPHY NECK: CPT | Mod: 26

## 2024-11-22 PROCEDURE — 0042T: CPT

## 2024-11-23 DIAGNOSIS — R90.89 OTHER ABNORMAL FINDINGS ON DIAGNOSTIC IMAGING OF CENTRAL NERVOUS SYSTEM: ICD-10-CM

## 2024-11-27 ENCOUNTER — APPOINTMENT (OUTPATIENT)
Dept: NEUROSURGERY | Facility: CLINIC | Age: 59
End: 2024-11-27
Payer: COMMERCIAL

## 2024-11-27 VITALS
DIASTOLIC BLOOD PRESSURE: 80 MMHG | HEIGHT: 60 IN | WEIGHT: 181 LBS | SYSTOLIC BLOOD PRESSURE: 124 MMHG | BODY MASS INDEX: 35.53 KG/M2

## 2024-11-27 DIAGNOSIS — I65.02 OCCLUSION AND STENOSIS OF LEFT VERTEBRAL ARTERY: ICD-10-CM

## 2024-11-27 DIAGNOSIS — I65.09 OCCLUSION AND STENOSIS OF UNSPECIFIED VERTEBRAL ARTERY: ICD-10-CM

## 2024-11-27 PROCEDURE — 99213 OFFICE O/P EST LOW 20 MIN: CPT

## 2025-07-12 ENCOUNTER — RX RENEWAL (OUTPATIENT)
Age: 60
End: 2025-07-12

## 2025-07-12 RX ORDER — ASPIRIN 81 MG/1
81 TABLET, CHEWABLE ORAL DAILY
Qty: 90 | Refills: 3 | Status: ACTIVE | COMMUNITY
Start: 2025-07-12 | End: 1900-01-01